# Patient Record
Sex: MALE | Race: WHITE | NOT HISPANIC OR LATINO | URBAN - METROPOLITAN AREA
[De-identification: names, ages, dates, MRNs, and addresses within clinical notes are randomized per-mention and may not be internally consistent; named-entity substitution may affect disease eponyms.]

---

## 2018-03-22 ENCOUNTER — IMPORTED ENCOUNTER (OUTPATIENT)
Dept: URBAN - METROPOLITAN AREA CLINIC 50 | Facility: CLINIC | Age: 83
End: 2018-03-22

## 2018-04-23 ENCOUNTER — IMPORTED ENCOUNTER (OUTPATIENT)
Dept: URBAN - METROPOLITAN AREA CLINIC 50 | Facility: CLINIC | Age: 83
End: 2018-04-23

## 2018-05-30 ENCOUNTER — IMPORTED ENCOUNTER (OUTPATIENT)
Dept: URBAN - METROPOLITAN AREA CLINIC 50 | Facility: CLINIC | Age: 83
End: 2018-05-30

## 2018-09-28 NOTE — PATIENT DISCUSSION
Continue: PreserVision AREDS 2 (vit c,i-gm-crvxi-lutein-zeaxan): capsule: 200-097-25-7 mg-unit-mg-mg 1 capsule twice a day as directed by mouth

## 2018-10-30 NOTE — PATIENT DISCUSSION
Continue: PreserVision AREDS 2 (vit c,j-ia-ertwv-lutein-zeaxan): capsule: 750-874-47-9 mg-unit-mg-mg 1 capsule twice a day as directed by mouth

## 2018-10-30 NOTE — PATIENT DISCUSSION
STABLE NON-EXUDATIVE AMD, OD:  CONTINUE AREDS 2 VITAMINS / AMSLER GRID QD/ UV PROTECTION. SMOKING AVOIDANCE REVIEWED. RETURN FOR FOLLOW-UP AS SCHEDULED.

## 2021-04-18 ASSESSMENT — VISUAL ACUITY
OD_CC: J1+
OS_SC: 20/25
OD_SC: 20/20
OD_OTHER: 20/50-. 20/100.
OS_CC: J1+
OS_BAT: 20/50-
OS_OTHER: 20/50-. 20/100.
OD_BAT: 20/50-

## 2021-04-18 ASSESSMENT — TONOMETRY
OD_IOP_MMHG: 10
OS_IOP_MMHG: 10

## 2022-09-08 NOTE — PATIENT DISCUSSION
AMD (WET), OS:  ALL TX OPTIONS DISCUSSED. PATIENT ELECTED TO DEFER [facility]. RETURN AS SCHEDULED TO START AVASTIN (1.25MG) INTRAVITREAL INJECTIONS. Monitor.